# Patient Record
Sex: FEMALE | Race: WHITE
[De-identification: names, ages, dates, MRNs, and addresses within clinical notes are randomized per-mention and may not be internally consistent; named-entity substitution may affect disease eponyms.]

---

## 2017-06-09 ENCOUNTER — APPOINTMENT (OUTPATIENT)
Dept: DERMATOLOGY | Facility: CLINIC | Age: 57
End: 2017-06-09

## 2017-06-09 VITALS — WEIGHT: 160 LBS | HEIGHT: 63 IN | BODY MASS INDEX: 28.35 KG/M2

## 2017-06-09 DIAGNOSIS — Z86.79 PERSONAL HISTORY OF OTHER DISEASES OF THE CIRCULATORY SYSTEM: ICD-10-CM

## 2017-06-09 DIAGNOSIS — L21.9 SEBORRHEIC DERMATITIS, UNSPECIFIED: ICD-10-CM

## 2017-06-09 DIAGNOSIS — Z86.39 PERSONAL HISTORY OF OTHER ENDOCRINE, NUTRITIONAL AND METABOLIC DISEASE: ICD-10-CM

## 2017-06-09 RX ORDER — ESCITALOPRAM OXALATE 20 MG/1
20 TABLET, FILM COATED ORAL
Refills: 0 | Status: ACTIVE | COMMUNITY

## 2019-03-15 ENCOUNTER — MEDICATION RENEWAL (OUTPATIENT)
Age: 59
End: 2019-03-15

## 2019-05-21 ENCOUNTER — APPOINTMENT (OUTPATIENT)
Dept: DERMATOLOGY | Facility: CLINIC | Age: 59
End: 2019-05-21

## 2019-07-19 ENCOUNTER — EMERGENCY (EMERGENCY)
Facility: HOSPITAL | Age: 59
LOS: 0 days | Discharge: HOME | End: 2019-07-19
Attending: EMERGENCY MEDICINE | Admitting: EMERGENCY MEDICINE
Payer: OTHER MISCELLANEOUS

## 2019-07-19 VITALS
SYSTOLIC BLOOD PRESSURE: 146 MMHG | TEMPERATURE: 98 F | OXYGEN SATURATION: 98 % | DIASTOLIC BLOOD PRESSURE: 75 MMHG | HEART RATE: 76 BPM | RESPIRATION RATE: 18 BRPM

## 2019-07-19 VITALS — HEIGHT: 63 IN | WEIGHT: 179.9 LBS
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
## 2019-07-19 DIAGNOSIS — S60.111A CONTUSION OF RIGHT THUMB WITH DAMAGE TO NAIL, INITIAL ENCOUNTER: ICD-10-CM

## 2019-07-19 PROCEDURE — 99283 EMERGENCY DEPT VISIT LOW MDM: CPT

## 2019-07-19 PROCEDURE — 73140 X-RAY EXAM OF FINGER(S): CPT | Mod: 26,RT

## 2019-07-19 RX ORDER — ACETAMINOPHEN 500 MG
1000 TABLET ORAL ONCE
Refills: 0 | Status: COMPLETED | OUTPATIENT
Start: 2019-07-19 | End: 2019-07-19

## 2019-07-19 RX ADMIN — Medication 1000 MILLIGRAM(S): at 15:46

## 2019-07-19 NOTE — ED STATDOCS - PROGRESS NOTE DETAILS
Patient seen and evaluated.  No fracture.  Nail slightly lifting s/p previous injury 7 weeks ago, new nail growing underneath.  Offered to remove nail for patient, also offered hand consult.  She declines both -Fannie Mohan PA-C

## 2019-07-19 NOTE — ED ADULT TRIAGE NOTE - CHIEF COMPLAINT QUOTE
Patient presents with right thumb pain, patient reports she slammed it in a safe while at work. Patient reports right hand dominance.

## 2019-07-19 NOTE — ED STATDOCS - OBJECTIVE STATEMENT
59 y/o F with no relevant PMHx presenting to the ED s/p R thumb injury sustained today. Pt states that she was at work when she slammed her R finger in a safe. Pt notes that she is R hand dominant. Denies HA.

## 2019-07-19 NOTE — ED STATDOCS - CLINICAL SUMMARY MEDICAL DECISION MAKING FREE TEXT BOX
Patient presents with repeat injury to her thumb. No fracture on xray.  Reviewed management of fingernail and pain at home as she declines nail removal and hand consult.

## 2019-07-19 NOTE — ED ADULT NURSE NOTE - OBJECTIVE STATEMENT
pt presents to ED with right thumb pain/swelling s/p injury. pt states about 7 weeks ago pt slammed finger in safe at work .pt states she slammed her finger in the safe again today. [+ swelling noted. + mild redness noted. + ecchymosis noted to right thumb. a&ox3. breathing is even and unlabored. will continue to monitor and reassess.

## 2020-10-22 PROBLEM — Z78.9 OTHER SPECIFIED HEALTH STATUS: Chronic | Status: ACTIVE | Noted: 2019-07-19

## 2020-10-26 ENCOUNTER — APPOINTMENT (OUTPATIENT)
Dept: DERMATOLOGY | Facility: CLINIC | Age: 60
End: 2020-10-26
Payer: COMMERCIAL

## 2020-10-26 VITALS — BODY MASS INDEX: 28.35 KG/M2 | WEIGHT: 160 LBS | HEIGHT: 63 IN

## 2020-10-26 PROCEDURE — 99072 ADDL SUPL MATRL&STAF TM PHE: CPT

## 2020-10-26 PROCEDURE — 99203 OFFICE O/P NEW LOW 30 MIN: CPT

## 2020-10-26 RX ORDER — SULFACETAMIDE SODIUM, SULFUR 100; 50 MG/G; MG/G
10-5 EMULSION TOPICAL TWICE DAILY
Qty: 1 | Refills: 5 | Status: ACTIVE | COMMUNITY
Start: 2017-06-09 | End: 1900-01-01

## 2020-10-26 RX ORDER — ALPRAZOLAM 1 MG/1
1 TABLET ORAL
Qty: 60 | Refills: 0 | Status: ACTIVE | COMMUNITY
Start: 2020-10-20

## 2020-10-26 RX ORDER — LEVOTHYROXINE SODIUM 0.11 MG/1
112 TABLET ORAL
Qty: 30 | Refills: 0 | Status: ACTIVE | COMMUNITY
Start: 2020-06-02

## 2020-10-26 RX ORDER — METRONIDAZOLE 7.5 MG/G
0.75 CREAM TOPICAL
Qty: 45 | Refills: 3 | Status: ACTIVE | COMMUNITY
Start: 2020-10-26 | End: 1900-01-01

## 2020-10-26 RX ORDER — MINOCYCLINE HYDROCHLORIDE 100 MG/1
100 CAPSULE ORAL
Qty: 60 | Refills: 0 | Status: ACTIVE | COMMUNITY
Start: 2020-08-31

## 2020-10-26 RX ORDER — MINOCYCLINE HYDROCHLORIDE 50 MG/1
50 CAPSULE ORAL TWICE DAILY
Qty: 60 | Refills: 2 | Status: DISCONTINUED | COMMUNITY
Start: 2017-06-09 | End: 2020-10-26

## 2020-10-26 NOTE — HISTORY OF PRESENT ILLNESS
[de-identified] : The patient has been fit in for urgent appointment. Pt. c/o flaring of rosacea of face;  used multiple txs in past;\par on  BID, started approx 2 wks ago; \par

## 2020-10-26 NOTE — ASSESSMENT
[FreeTextEntry1] : Rosacea;  flaring, exacerbated by facemasks; \par on  BID, continue, will improve over next 2-3 wks; \par cont sulfa wash; also metrocream BID (has noritate at home)\par f/u 4-6 wks;  will taper if improved;  may need oracea as long as wearing masks

## 2020-10-26 NOTE — PHYSICAL EXAM
[FreeTextEntry3] : Skin examination performed of the face, neck, chest, hands, lower legs;\par The patient is well, alert and oriented, pleasant and cooperative.\par Eyelids, conjunctivae, oral mucosa, digits and nails all normal.  \par No cervical adenopathy.\par \par \par Erythema of central face with papules, pustules and telangiectasiae. and few nodules; \par cheeks, chin;  with some scale;

## 2020-12-04 ENCOUNTER — APPOINTMENT (OUTPATIENT)
Dept: DERMATOLOGY | Facility: CLINIC | Age: 60
End: 2020-12-04
Payer: COMMERCIAL

## 2020-12-04 PROCEDURE — 99072 ADDL SUPL MATRL&STAF TM PHE: CPT

## 2020-12-04 PROCEDURE — 99213 OFFICE O/P EST LOW 20 MIN: CPT

## 2020-12-04 NOTE — HISTORY OF PRESENT ILLNESS
[de-identified] : f/u for check of rosacea;  on  BID;  also sulfa wash, metrocream;  \par was doing well, however began to flare when going back to work after thanksgiving; \par

## 2020-12-04 NOTE — ASSESSMENT
[FreeTextEntry1] : rosacea: still active, but more flushing than inflammation; \par Therapeutic options and their risks and benefits; along with multiple diagnostic possibilities were discussed at length; risks and benefits of further study were discussed;\par \par exacerbated by masks, try to use fan to keep cool at work\par \par cont MCN, reduce to 100/d;  cont sulfa, try to substitute soolantra for metrocream;\par f/u 6-8 wks;

## 2020-12-09 ENCOUNTER — APPOINTMENT (OUTPATIENT)
Dept: DERMATOLOGY | Facility: CLINIC | Age: 60
End: 2020-12-09
Payer: COMMERCIAL

## 2020-12-09 PROCEDURE — 99072 ADDL SUPL MATRL&STAF TM PHE: CPT

## 2020-12-09 PROCEDURE — 99214 OFFICE O/P EST MOD 30 MIN: CPT

## 2020-12-09 RX ORDER — PREDNISONE 20 MG/1
20 TABLET ORAL
Qty: 21 | Refills: 0 | Status: ACTIVE | COMMUNITY
Start: 2020-12-09 | End: 1900-01-01

## 2020-12-09 NOTE — PHYSICAL EXAM
[FreeTextEntry3] : Erythema of central face with papules, pustules and telangiectasiae. \par also with eczematous patches on temples, cheeks, forehead;

## 2020-12-09 NOTE — ASSESSMENT
[FreeTextEntry1] : facial eruption; \par Therapeutic options and their risks and benefits; along with multiple diagnostic possibilities were discussed at length; risks and benefits of further study were discussed;\par \par clinically consistent with contact dermatitis;  ? from soolantra vs. other agent;  appears to have reacted to quickly to be from Rx; \par hold soolantra, cont MCN\par Prednisone taper 12 days;  when finished, try soolantra on one cheek, then gradually increase;  has f/u 1 month, sooner prn

## 2020-12-09 NOTE — HISTORY OF PRESENT ILLNESS
[de-identified] : The patient has been fit in for urgent appointment.\par Was seen 1 week ago;  just started Soolantra yesterday, now with significant itching, rashes over large part of face; \par still taking sulfa wash, MCN;

## 2021-01-22 ENCOUNTER — NON-APPOINTMENT (OUTPATIENT)
Age: 61
End: 2021-01-22

## 2021-01-22 ENCOUNTER — APPOINTMENT (OUTPATIENT)
Dept: DERMATOLOGY | Facility: CLINIC | Age: 61
End: 2021-01-22
Payer: COMMERCIAL

## 2021-01-22 DIAGNOSIS — L71.9 ROSACEA, UNSPECIFIED: ICD-10-CM

## 2021-01-22 DIAGNOSIS — L23.89 ALLERGIC CONTACT DERMATITIS DUE TO OTHER AGENTS: ICD-10-CM

## 2021-01-22 PROCEDURE — 99214 OFFICE O/P EST MOD 30 MIN: CPT

## 2021-01-22 PROCEDURE — 99072 ADDL SUPL MATRL&STAF TM PHE: CPT

## 2021-01-22 NOTE — ASSESSMENT
[FreeTextEntry1] : Facial itching, inflammation;  \par now more consistent with allergic contact, superimposed upon previously well controlled rosacea; \par continue MCN, soolantra for now\par set up patch testing- ? nickel allergy to mask\par \par Therapeutic options and their risks and benefits; along with multiple diagnostic possibilities were discussed at length; risks and benefits of further study were discussed;\par \par

## 2021-03-01 ENCOUNTER — APPOINTMENT (OUTPATIENT)
Dept: DERMATOLOGY | Facility: CLINIC | Age: 61
End: 2021-03-01

## 2021-03-03 ENCOUNTER — APPOINTMENT (OUTPATIENT)
Dept: DERMATOLOGY | Facility: CLINIC | Age: 61
End: 2021-03-03

## 2021-03-05 ENCOUNTER — APPOINTMENT (OUTPATIENT)
Dept: DERMATOLOGY | Facility: CLINIC | Age: 61
End: 2021-03-05

## 2021-03-10 ENCOUNTER — APPOINTMENT (OUTPATIENT)
Dept: ORTHOPEDIC SURGERY | Facility: CLINIC | Age: 61
End: 2021-03-10
Payer: COMMERCIAL

## 2021-03-10 DIAGNOSIS — M79.642 PAIN IN LEFT HAND: ICD-10-CM

## 2021-03-10 PROCEDURE — 99204 OFFICE O/P NEW MOD 45 MIN: CPT

## 2021-03-10 PROCEDURE — 99072 ADDL SUPL MATRL&STAF TM PHE: CPT

## 2021-03-10 PROCEDURE — 73130 X-RAY EXAM OF HAND: CPT | Mod: 26,LT

## 2021-03-10 NOTE — PHYSICAL EXAM
[de-identified] : Left hand Physical Examination:\par \par The patient is AAOx3.  \par Neurovascularly Intact: Yes\par Erythema, Warmth, Rubor: Negative\par Swelling: Negative\par \par *Wrist ROM normal in all planes. \par \par ROM Fingers:\par MCPJ: 0-90\par PIPJ: 0-120\par DIPJ: 0-60\par Thumb IPJ: 0-60\par *Patient can make a normal fist with all fingers pointing normally towards the scaphoid. \par \par Thumb ROM:\par MCP Joint: Extension 5 degrees/Flexion 60 degrees\par IP Joint: 10 degrees Hyperextension/Flexion 80 degrees\par *Normal thumb adduction into the palm of the hand. \par Grind Test (CMC Joint OA): Positive with severe pain.\par \par Osteoarthritis: \par Heberden's Node's (DIP): Negative\par Carolee's Node's (PIP): Negative\par Lefors Neck Deformity (RA): Negative\par Ulnar Deviation at MCPJ (RA): Negative\par \par Deformities/Abnormalities:\par Mallet Finger: Negative\par Dupuytren's Contractures: Negative\par Trigger Finger: Negative\par \par  [de-identified] : X-rays of the left hand reviewed, 3/10/2021: CMC arthritis.  No fractures present.

## 2021-03-10 NOTE — HISTORY OF PRESENT ILLNESS
[de-identified] : Mariana is a 60-year-old female who presents with left hand/thumb pain.  She relates the pain to work.  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  The patient started to experience base of thumb pain left hand.  Her pain scale at worst is an 8 out of 10.  She has no numbness or tingling in the hand or wrist.  She has no other complaints.  The patient cannot take NSAIDs.

## 2021-03-10 NOTE — DISCUSSION/SUMMARY
[de-identified] : 1.  Fluoroscopic injection left CMC ordered, cortisone.  Patient was given a prescription and she can follow-up with interventional radiology.\par 2.  Tylenol arthritis prescribed.  Use as directed with food.\par 3.  CMC cool splint given.  Wear as directed.\par 4.  Follow-up in 6 to 8 weeks if pain continues with hand surgeon.\par 5.  All of her questions were answered.  She understood the treatment course at this time.

## 2021-03-22 ENCOUNTER — APPOINTMENT (OUTPATIENT)
Dept: DERMATOLOGY | Facility: CLINIC | Age: 61
End: 2021-03-22

## 2021-03-24 ENCOUNTER — APPOINTMENT (OUTPATIENT)
Dept: DERMATOLOGY | Facility: CLINIC | Age: 61
End: 2021-03-24

## 2021-03-26 ENCOUNTER — APPOINTMENT (OUTPATIENT)
Dept: DERMATOLOGY | Facility: CLINIC | Age: 61
End: 2021-03-26

## 2021-04-23 ENCOUNTER — APPOINTMENT (OUTPATIENT)
Dept: ORTHOPEDIC SURGERY | Facility: CLINIC | Age: 61
End: 2021-04-23
Payer: COMMERCIAL

## 2021-04-23 PROCEDURE — 99213 OFFICE O/P EST LOW 20 MIN: CPT

## 2021-04-23 PROCEDURE — 99072 ADDL SUPL MATRL&STAF TM PHE: CPT

## 2021-04-23 NOTE — PHYSICAL EXAM
[Normal Touch] : sensation intact for  touch [Normal] : no peripheral adenopathy appreciated [de-identified] : Left hand: There is tenderness over the left basal joint with a positive grind test and shoulder deformity. There is a positive crank test. There is swelling appreciated over the affected CMC joint which is not present on the opposite side. There is no evidence of infection or lymphadenopathy bilaterally to the level of the elbows There is no evidence of MCP hyperextension bilaterally. There is a negative Finkelstein's test There is no tenderness over the A-1 pulleys bilaterally. 5/5 strength bilaterally. \par \par The wrists have a symmetric and full range of motion bilaterally with no pain upon forced flexion, extension, pronation and supination. There is no tenderness over the scaphoid scapholunate region ligaments and no tenderness of the TFCC bilaterally. There is no tenderness of the pisotriquetral hamate hook. The second through fifth CMC his is stable and nontender bilaterally.\par There is a negative carpal tunnel compression test or Tinel's bilaterally.   [de-identified] : bernadetter [de-identified] : 3 views of the left hand reviewed taken previously reveal moderate to severe basal joint arthritis with joint space narrowing and osteophyte formation.  There is no fracture or acute bony abnormality.

## 2021-04-23 NOTE — ASSESSMENT
[FreeTextEntry1] : 60-year-old female presents today for reevaluation following conservative management of left basal joint arthritis.\par She reports an 85% improvement in her symptoms with use of a Comfort Cool thumb brace and status post steroid injection on April 5.  We discussed the nature and history of the condition at length.  The spectrum of treatment including nonoperative modalities to surgical intervention was elucidated.  At this time she has been recommended for continued conservative management.  She will continue use of the Comfort Cool thumb brace for activities and for comfort.  She will take over-the-counter Tylenol arthritis for pain.  She has been recommended for as needed follow-up for repeat steroid injections, and has been told that she should wait 3 months in between injections.  She is understanding of the above and is in agreement with the recommended plan.  All questions have been answered.

## 2021-11-29 ENCOUNTER — APPOINTMENT (OUTPATIENT)
Dept: ORTHOPEDIC SURGERY | Facility: CLINIC | Age: 61
End: 2021-11-29
Payer: COMMERCIAL

## 2021-11-29 DIAGNOSIS — S89.91XA UNSPECIFIED INJURY OF RIGHT LOWER LEG, INITIAL ENCOUNTER: ICD-10-CM

## 2021-11-29 PROCEDURE — 99214 OFFICE O/P EST MOD 30 MIN: CPT

## 2021-11-29 PROCEDURE — 73562 X-RAY EXAM OF KNEE 3: CPT | Mod: RT

## 2021-11-29 NOTE — DISCUSSION/SUMMARY
[de-identified] : Today I had a lengthy discussion with the patient regarding their right knee pain. I have addressed all the patient's concerns surrounding the pathology of their condition. XR imaging was completed in office today and results were reviewed with the patient. I recommend the patient undergo a course of physical therapy for the right knee  2-3 times a week for a total of 6-8 weeks. A prescription was given for the physical therapy today. I recommend that the patient utilize Voltaren gel topically. If the Voltaren gel could not be obtained, Icy Hot, Biofreeze, or Bengay can be utilized instead. I recommend that the patient utilize ice, Tylenol, and heat PRN. They can also elevate their right knee above the level of the heart. She may utilize an OTC knee sleeve as tolerated. \par \par The patient understood and verbally agreed to the treatment plan. All of their questions were answered and they were satisfied with the visit. The patient should call the office if they have any questions or experience worsening symptoms. I would like to see the patient back in the office in 6-8 weeks if there is no improvement to reassess their condition. 				\par \par \par Possible MRI if there is no improvement or if there is worsening in symptoms.

## 2021-11-29 NOTE — PHYSICAL EXAM
[de-identified] : General: Alert and oriented x3. In no acute distress. Pleasant in nature with a normal affect. No apparent respiratory distress.\par \par R Knee Exam\par Skin: Clean, dry, intact\par Inspection: No obvious malalignment, no masses, no swelling, no effusion\par Pulses: 2+ DP/PT pulses\par ROM: R Knee 0-130 degrees of flexion. No pain with deep knee flexion.\par Tenderness: + Medial femoral condyle pain. No MJLT. No LJLT. No pain over the patella facets. No pain to the quadriceps mechanism.\par Stability: Stable to varus, valgus, lachman testing. Negative anterior/posterior drawer.\par Strength: 5/5 Q/H/TA/GS/EHL, no atrophy\par Neuro: In tact to light touch throughout, DTR's normal\par Additional tests: Negative McMurrays test, Negative patellar grind test.		\par  [de-identified] : 3V of the right knee were ordered, obtained, and reviewed by me today, 11/29/2021, revealed: No acute fractures\par

## 2021-11-29 NOTE — HISTORY OF PRESENT ILLNESS
[de-identified] : 60 yo female presents to the office for right knee pain. Her pain began approximately 3 weeks ago when she was running to get to a pot of boiling water in her kitchen and slipped and sustained a direct impact to her right knee.  She notes that after the injury she was having difficulty walking as she had significant pain and stiffness of her knee.  She had tried ice and rest noting some improvements over the past 2 weeks.  Patient notes that she does not take NSAIDs but has taken some Tylenol for her pain.  She continues to have a lot of pain getting up from a seated position and as a result has avoided stairs at work and is only taking the elevator.  Today she rates her pain a 6 out of 10 and says it is a constant dull aching along the medial side of her right knee that worsens with walking and becomes a sharp pain.  She has no other complaints at this time.

## 2021-11-29 NOTE — ADDENDUM
[FreeTextEntry1] : I, Alda Mendieta, acted solely as a scribe for Dr. Yobani Renae on this date 11/29/2021.\par \par All medical record entries made by the Scribe were at my, Dr. Yobani Renae, direction and personally dictated by me on 11/29/2021 . I have reviewed the chart and agree that the record accurately reflects my personal performance of the history, physical exam, assessment and plan. I have also personally directed, reviewed, and agreed with the chart.	\par

## 2021-12-06 ENCOUNTER — APPOINTMENT (OUTPATIENT)
Dept: ORTHOPEDIC SURGERY | Facility: CLINIC | Age: 61
End: 2021-12-06
Payer: COMMERCIAL

## 2021-12-06 DIAGNOSIS — M19.049 PRIMARY OSTEOARTHRITIS, UNSPECIFIED HAND: ICD-10-CM

## 2021-12-06 PROCEDURE — 20600 DRAIN/INJ JOINT/BURSA W/O US: CPT | Mod: FA

## 2021-12-06 PROCEDURE — 99213 OFFICE O/P EST LOW 20 MIN: CPT | Mod: 25

## 2021-12-06 NOTE — ASSESSMENT
[FreeTextEntry1] : 60-year-old female presents today for reevaluation of her left basal joint arthritis. She received an injection today as dictated above, tolerating the procedure well. She has been recommended to continue with use of bracing as needed for activity, as well as use of topical NSAID/tylenol for pain. She will follow up on an as needed basis.

## 2021-12-06 NOTE — PROCEDURE
[FreeTextEntry1] : Injection: Thumb Left\par \par There was a discussion with the patient regarding this procedure and all questions/concerns were answered.  All of the risks, benefits and alternatives were discussed at length.  These include but are not limited to bleeding, infection, and allergic reaction.  Alcohol was used to clean, sterilize and prep the skin at the injection site on the radial aspect of the left thumb base.  0.5cc of 1% lidocaine was injected using a 27G needle to the injection site.  A 25G needle was used to inject 1cc of 4mg/mL dexamethasone into the left thumb base under fluoroscopic guidance.  A sterile bandage was applied to the site of the injection.  The patient tolerated the procedure well and there were no complications.

## 2021-12-06 NOTE — PHYSICAL EXAM
[Normal Touch] : sensation intact for  touch [Normal] : no peripheral adenopathy appreciated [de-identified] : Left hand: There is tenderness over the left basal joint with a positive grind test and shoulder deformity. There is a positive crank test. There is swelling appreciated over the affected CMC joint which is not present on the opposite side. There is no evidence of infection or lymphadenopathy bilaterally to the level of the elbows There is no evidence of MCP hyperextension bilaterally. There is a negative Finkelstein's test There is no tenderness over the A-1 pulleys bilaterally. 5/5 strength bilaterally. \par \par The wrists have a symmetric and full range of motion bilaterally with no pain upon forced flexion, extension, pronation and supination. There is no tenderness over the scaphoid scapholunate region ligaments and no tenderness of the TFCC bilaterally. There is no tenderness of the pisotriquetral hamate hook. The second through fifth CMC his is stable and nontender bilaterally.\par There is a negative carpal tunnel compression test or Tinel's bilaterally.   [de-identified] : bernadetter [de-identified] : 3 views of the left hand reviewed taken previously reveal moderate to severe basal joint arthritis with joint space narrowing and osteophyte formation.  There is no fracture or acute bony abnormality.

## 2021-12-06 NOTE — HISTORY OF PRESENT ILLNESS
[FreeTextEntry1] : 60-year-old female presents today for reevaluation of her left thumb pain.  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  She has been treated conservatively today with use of a Comfort Cool thumb brace and activity modification.  She was recommended for an ultrasound-guided steroid injection to the left basal joint, and notes that she had it completed on April 5.  She reports significant benefit from the injection and notes she is about 85% improved.  She has been taking Tylenol as needed, notes she cannot take NSAIDs due to prior gastric surgery.  The patient presents today for evaluation and further treatment.\par \par 12/06/2021: Patient presents for reevaluation of her left thumb pain, 2/2 basal joint arthritis. She has been wearing a comfort cool brace with some relief, but overall has noted an increase in pain over the last couple of months. She reports pain with activity such as twisting, lifting, turning, pushing and gripping activity. She reports benefit from the initial steroid injection she received in April, and would like to consider a repeat steroid injection.

## 2021-12-22 ENCOUNTER — OUTPATIENT (OUTPATIENT)
Dept: OUTPATIENT SERVICES | Facility: HOSPITAL | Age: 61
LOS: 1 days | End: 2021-12-22
Payer: COMMERCIAL

## 2021-12-22 DIAGNOSIS — Z20.828 CONTACT WITH AND (SUSPECTED) EXPOSURE TO OTHER VIRAL COMMUNICABLE DISEASES: ICD-10-CM

## 2021-12-22 LAB — SARS-COV-2 RNA SPEC QL NAA+PROBE: SIGNIFICANT CHANGE UP

## 2021-12-22 PROCEDURE — U0005: CPT

## 2021-12-22 PROCEDURE — C9803: CPT

## 2021-12-22 PROCEDURE — U0003: CPT

## 2021-12-23 DIAGNOSIS — Z20.828 CONTACT WITH AND (SUSPECTED) EXPOSURE TO OTHER VIRAL COMMUNICABLE DISEASES: ICD-10-CM

## 2021-12-30 ENCOUNTER — OUTPATIENT (OUTPATIENT)
Dept: OUTPATIENT SERVICES | Facility: HOSPITAL | Age: 61
LOS: 1 days | End: 2021-12-30
Payer: COMMERCIAL

## 2021-12-30 DIAGNOSIS — Z20.828 CONTACT WITH AND (SUSPECTED) EXPOSURE TO OTHER VIRAL COMMUNICABLE DISEASES: ICD-10-CM

## 2021-12-30 PROCEDURE — U0003: CPT

## 2021-12-30 PROCEDURE — U0005: CPT

## 2021-12-31 DIAGNOSIS — Z20.828 CONTACT WITH AND (SUSPECTED) EXPOSURE TO OTHER VIRAL COMMUNICABLE DISEASES: ICD-10-CM

## 2022-03-02 ENCOUNTER — RX RENEWAL (OUTPATIENT)
Age: 62
End: 2022-03-02

## 2022-03-28 RX ORDER — IVERMECTIN 10 MG/G
1 CREAM TOPICAL
Qty: 45 | Refills: 3 | Status: ACTIVE | COMMUNITY
Start: 2020-12-04 | End: 1900-01-01

## 2023-06-22 ENCOUNTER — OUTPATIENT (OUTPATIENT)
Dept: OUTPATIENT SERVICES | Facility: HOSPITAL | Age: 63
LOS: 1 days | End: 2023-06-22
Payer: COMMERCIAL

## 2023-06-22 DIAGNOSIS — R73.01 IMPAIRED FASTING GLUCOSE: ICD-10-CM

## 2023-06-22 DIAGNOSIS — E78.5 HYPERLIPIDEMIA, UNSPECIFIED: ICD-10-CM

## 2023-06-22 PROCEDURE — 80061 LIPID PANEL: CPT

## 2023-06-22 PROCEDURE — 36415 COLL VENOUS BLD VENIPUNCTURE: CPT

## 2023-06-22 PROCEDURE — 84443 ASSAY THYROID STIM HORMONE: CPT

## 2023-06-22 PROCEDURE — 83036 HEMOGLOBIN GLYCOSYLATED A1C: CPT

## 2023-06-22 PROCEDURE — 80053 COMPREHEN METABOLIC PANEL: CPT

## 2023-06-22 PROCEDURE — 84439 ASSAY OF FREE THYROXINE: CPT

## 2023-06-23 ENCOUNTER — TRANSCRIPTION ENCOUNTER (OUTPATIENT)
Age: 63
End: 2023-06-23

## 2023-06-23 DIAGNOSIS — R73.01 IMPAIRED FASTING GLUCOSE: ICD-10-CM

## 2023-06-23 DIAGNOSIS — E78.5 HYPERLIPIDEMIA, UNSPECIFIED: ICD-10-CM

## 2024-02-05 ENCOUNTER — OFFICE (OUTPATIENT)
Dept: URBAN - METROPOLITAN AREA CLINIC 102 | Facility: CLINIC | Age: 64
Setting detail: OPHTHALMOLOGY
End: 2024-02-05
Payer: MEDICARE

## 2024-02-05 DIAGNOSIS — H25.13: ICD-10-CM

## 2024-02-05 DIAGNOSIS — H35.363: ICD-10-CM

## 2024-02-05 DIAGNOSIS — H35.3132: ICD-10-CM

## 2024-02-05 DIAGNOSIS — H52.4: ICD-10-CM

## 2024-02-05 PROBLEM — H52.7 REFRACTIVE ERROR: Status: ACTIVE | Noted: 2024-02-05

## 2024-02-05 PROCEDURE — 92015 DETERMINE REFRACTIVE STATE: CPT | Performed by: OPHTHALMOLOGY

## 2024-02-05 PROCEDURE — 92250 FUNDUS PHOTOGRAPHY W/I&R: CPT | Performed by: OPHTHALMOLOGY

## 2024-02-05 PROCEDURE — 92020 GONIOSCOPY: CPT | Performed by: OPHTHALMOLOGY

## 2024-02-05 PROCEDURE — 99204 OFFICE O/P NEW MOD 45 MIN: CPT | Performed by: OPHTHALMOLOGY

## 2024-02-05 ASSESSMENT — REFRACTION_CURRENTRX
OS_AXIS: 174
OD_VPRISM_DIRECTION: SV
OD_SPHERE: +5.00
OS_VPRISM_DIRECTION: SV
OS_AXIS: 159
OD_OVR_VA: 20/
OS_SPHERE: +4.75
OD_VPRISM_DIRECTION: SV
OD_CYLINDER: -0.75
OD_OVR_VA: 20/
OS_OVR_VA: 20/
OD_SPHERE: +2.00
OS_SPHERE: +2.00
OD_AXIS: 109
OD_AXIS: 019
OS_CYLINDER: -0.50
OD_CYLINDER: -0.50
OS_OVR_VA: 20/
OS_CYLINDER: -0.50
OS_VPRISM_DIRECTION: SV

## 2024-02-05 ASSESSMENT — REFRACTION_MANIFEST
OD_SPHERE: +1.75
OS_SPHERE: +1.75
OD_VA1: 20/40
OD_ADD: +2.50
OS_CYLINDER: SPH
OD_CYLINDER: -0.25
OS_ADD: +2.50
OD_AXIS: 070
OS_VA1: 20/40+

## 2024-02-05 ASSESSMENT — SPHEQUIV_DERIVED
OS_SPHEQUIV: 1.5
OD_SPHEQUIV: 1.625
OD_SPHEQUIV: 1.875

## 2024-02-05 ASSESSMENT — REFRACTION_AUTOREFRACTION
OS_SPHERE: +1.75
OD_SPHERE: +2.00
OD_CYLINDER: -0.25
OS_AXIS: 135
OD_AXIS: 072
OS_CYLINDER: -0.50

## 2024-02-05 ASSESSMENT — CONFRONTATIONAL VISUAL FIELD TEST (CVF)
OS_FINDINGS: FULL
OD_FINDINGS: FULL

## 2024-05-31 ENCOUNTER — EMERGENCY (EMERGENCY)
Facility: HOSPITAL | Age: 64
LOS: 0 days | Discharge: HOME | End: 2024-05-31
Attending: EMERGENCY MEDICINE
Payer: MEDICARE

## 2024-05-31 VITALS
DIASTOLIC BLOOD PRESSURE: 77 MMHG | OXYGEN SATURATION: 96 % | WEIGHT: 239.86 LBS | SYSTOLIC BLOOD PRESSURE: 131 MMHG | TEMPERATURE: 98 F | HEART RATE: 100 BPM | RESPIRATION RATE: 19 BRPM

## 2024-05-31 VITALS
RESPIRATION RATE: 20 BRPM | OXYGEN SATURATION: 99 % | HEART RATE: 100 BPM | DIASTOLIC BLOOD PRESSURE: 86 MMHG | SYSTOLIC BLOOD PRESSURE: 144 MMHG | TEMPERATURE: 99 F

## 2024-05-31 DIAGNOSIS — Z91.018 ALLERGY TO OTHER FOODS: ICD-10-CM

## 2024-05-31 DIAGNOSIS — F10.129 ALCOHOL ABUSE WITH INTOXICATION, UNSPECIFIED: ICD-10-CM

## 2024-05-31 DIAGNOSIS — R55 SYNCOPE AND COLLAPSE: ICD-10-CM

## 2024-05-31 LAB
ALBUMIN SERPL ELPH-MCNC: 3.4 G/DL — SIGNIFICANT CHANGE UP (ref 3.3–5)
ALP SERPL-CCNC: 92 U/L — SIGNIFICANT CHANGE UP (ref 40–120)
ALT FLD-CCNC: 91 U/L — HIGH (ref 12–78)
ANION GAP SERPL CALC-SCNC: 5 MMOL/L — SIGNIFICANT CHANGE UP (ref 5–17)
AST SERPL-CCNC: 125 U/L — HIGH (ref 15–37)
BASOPHILS # BLD AUTO: 0.08 K/UL — SIGNIFICANT CHANGE UP (ref 0–0.2)
BASOPHILS NFR BLD AUTO: 1 % — SIGNIFICANT CHANGE UP (ref 0–2)
BILIRUB SERPL-MCNC: 0.3 MG/DL — SIGNIFICANT CHANGE UP (ref 0.2–1.2)
BUN SERPL-MCNC: 17 MG/DL — SIGNIFICANT CHANGE UP (ref 7–23)
CALCIUM SERPL-MCNC: 8.4 MG/DL — LOW (ref 8.5–10.1)
CHLORIDE SERPL-SCNC: 107 MMOL/L — SIGNIFICANT CHANGE UP (ref 96–108)
CO2 SERPL-SCNC: 27 MMOL/L — SIGNIFICANT CHANGE UP (ref 22–31)
CREAT SERPL-MCNC: 0.82 MG/DL — SIGNIFICANT CHANGE UP (ref 0.5–1.3)
EGFR: 79 ML/MIN/1.73M2 — SIGNIFICANT CHANGE UP
EOSINOPHIL # BLD AUTO: 0.07 K/UL — SIGNIFICANT CHANGE UP (ref 0–0.5)
EOSINOPHIL NFR BLD AUTO: 0.9 % — SIGNIFICANT CHANGE UP (ref 0–6)
ETHANOL SERPL-MCNC: 299 MG/DL — HIGH (ref 0–10)
GLUCOSE SERPL-MCNC: 120 MG/DL — HIGH (ref 70–99)
HCT VFR BLD CALC: 44.2 % — SIGNIFICANT CHANGE UP (ref 34.5–45)
HGB BLD-MCNC: 14.6 G/DL — SIGNIFICANT CHANGE UP (ref 11.5–15.5)
IMM GRANULOCYTES NFR BLD AUTO: 0.3 % — SIGNIFICANT CHANGE UP (ref 0–0.9)
LYMPHOCYTES # BLD AUTO: 2.2 K/UL — SIGNIFICANT CHANGE UP (ref 1–3.3)
LYMPHOCYTES # BLD AUTO: 27.5 % — SIGNIFICANT CHANGE UP (ref 13–44)
MCHC RBC-ENTMCNC: 30.9 PG — SIGNIFICANT CHANGE UP (ref 27–34)
MCHC RBC-ENTMCNC: 33 GM/DL — SIGNIFICANT CHANGE UP (ref 32–36)
MCV RBC AUTO: 93.6 FL — SIGNIFICANT CHANGE UP (ref 80–100)
MONOCYTES # BLD AUTO: 0.62 K/UL — SIGNIFICANT CHANGE UP (ref 0–0.9)
MONOCYTES NFR BLD AUTO: 7.8 % — SIGNIFICANT CHANGE UP (ref 2–14)
NEUTROPHILS # BLD AUTO: 5.01 K/UL — SIGNIFICANT CHANGE UP (ref 1.8–7.4)
NEUTROPHILS NFR BLD AUTO: 62.5 % — SIGNIFICANT CHANGE UP (ref 43–77)
PLATELET # BLD AUTO: 236 K/UL — SIGNIFICANT CHANGE UP (ref 150–400)
POTASSIUM SERPL-MCNC: 4.1 MMOL/L — SIGNIFICANT CHANGE UP (ref 3.5–5.3)
POTASSIUM SERPL-SCNC: 4.1 MMOL/L — SIGNIFICANT CHANGE UP (ref 3.5–5.3)
PROT SERPL-MCNC: 8.1 GM/DL — SIGNIFICANT CHANGE UP (ref 6–8.3)
RBC # BLD: 4.72 M/UL — SIGNIFICANT CHANGE UP (ref 3.8–5.2)
RBC # FLD: 12.9 % — SIGNIFICANT CHANGE UP (ref 10.3–14.5)
SODIUM SERPL-SCNC: 139 MMOL/L — SIGNIFICANT CHANGE UP (ref 135–145)
WBC # BLD: 8 K/UL — SIGNIFICANT CHANGE UP (ref 3.8–10.5)
WBC # FLD AUTO: 8 K/UL — SIGNIFICANT CHANGE UP (ref 3.8–10.5)

## 2024-05-31 PROCEDURE — 80307 DRUG TEST PRSMV CHEM ANLYZR: CPT

## 2024-05-31 PROCEDURE — 85025 COMPLETE CBC W/AUTO DIFF WBC: CPT

## 2024-05-31 PROCEDURE — 99284 EMERGENCY DEPT VISIT MOD MDM: CPT

## 2024-05-31 PROCEDURE — 36415 COLL VENOUS BLD VENIPUNCTURE: CPT

## 2024-05-31 PROCEDURE — 99285 EMERGENCY DEPT VISIT HI MDM: CPT

## 2024-05-31 PROCEDURE — 80053 COMPREHEN METABOLIC PANEL: CPT

## 2024-05-31 NOTE — ED ADULT TRIAGE NOTE - CHIEF COMPLAINT QUOTE
pt bib wheelchair to triage from bathroom in hospital lobby. pt had an unwitnessed syncope while having bowel movement. unknown head strike. unknown down time. no obvious signs of trauma at this time. pmh Hashimoto's, HTN, HLD. -pain -blood thinners. +aob

## 2024-05-31 NOTE — ED ADULT NURSE NOTE - OBJECTIVE STATEMENT
Pt is 63y F, A&Ox3 who presents to ED with c/o fall. As per pt, "I was in the bathroom when I fell." -LOC, -HS, -use of blood thinners. Pt admits to drinking 1 bottle of wine PTA. Pt ambulatory. NAD noted. No obvious trauma noted. Denies CP/SOB, dizziness, n/v, headache, palpations.

## 2024-05-31 NOTE — ED PROVIDER NOTE - PATIENT PORTAL LINK FT
You can access the FollowMyHealth Patient Portal offered by [facility] by registering at the following website: [email or web address]. By joining [facility]’s FollowMyHealth portal, you will also be able to view your health information using other applications (apps) compatible with our system.

## 2024-05-31 NOTE — ED ADULT NURSE REASSESSMENT NOTE - NS ED NURSE REASSESS COMMENT FT1
Pt has no complaints at this time. NAD noted. Pt ambulated. MD Cabrera aware. Safety and comfort maintained.

## 2024-05-31 NOTE — ED ADULT NURSE NOTE - NSFALLUNIVINTERV_ED_ALL_ED
Bed/Stretcher in lowest position, wheels locked, appropriate side rails in place/Call bell, personal items and telephone in reach/Instruct patient to call for assistance before getting out of bed/chair/stretcher/Non-slip footwear applied when patient is off stretcher/Hunker to call system/Physically safe environment - no spills, clutter or unnecessary equipment/Purposeful proactive rounding/Room/bathroom lighting operational, light cord in reach

## 2024-05-31 NOTE — ED PROVIDER NOTE - OBJECTIVE STATEMENT
63-year-old female presents after being found down in the bathroom of the hospital while visiting patient.Per hospital staff patient was sitting in her own stool and urine, alcohol on breath.  Patient has no acute complaints at this time.Patient endorses drinking 1 bottle of wine prior to arrival.

## 2024-05-31 NOTE — ED PROVIDER NOTE - PHYSICAL EXAMINATION
Gen: Well appearing in NAD  Head: NC/AT  Neck: Trachea midline  Resp: No distress  Ext: No deformities  Neuro: Mildly slurred  Skin: Warm and dry as visualized  Psych: Normal affect and mood

## 2024-09-25 ENCOUNTER — APPOINTMENT (OUTPATIENT)
Dept: ORTHOPEDIC SURGERY | Facility: CLINIC | Age: 64
End: 2024-09-25

## 2024-09-25 VITALS — SYSTOLIC BLOOD PRESSURE: 129 MMHG | HEART RATE: 101 BPM | HEIGHT: 63 IN | DIASTOLIC BLOOD PRESSURE: 72 MMHG

## 2024-09-25 DIAGNOSIS — M75.41 IMPINGEMENT SYNDROME OF RIGHT SHOULDER: ICD-10-CM

## 2024-09-25 PROCEDURE — 73030 X-RAY EXAM OF SHOULDER: CPT | Mod: RT

## 2024-09-25 PROCEDURE — 99203 OFFICE O/P NEW LOW 30 MIN: CPT

## 2024-09-26 DIAGNOSIS — Z86.39 PERSONAL HISTORY OF OTHER ENDOCRINE, NUTRITIONAL AND METABOLIC DISEASE: ICD-10-CM

## 2024-09-26 DIAGNOSIS — Z87.39 PERSONAL HISTORY OF OTHER DISEASES OF THE MUSCULOSKELETAL SYSTEM AND CONNECTIVE TISSUE: ICD-10-CM

## 2024-09-30 PROBLEM — M75.41 IMPINGEMENT SYNDROME OF RIGHT SHOULDER: Status: ACTIVE | Noted: 2024-09-25

## 2024-09-30 NOTE — REVIEW OF SYSTEMS
[Joint Pain] : joint pain [Heartburn] : heartburn [Anxiety] : anxiety [Depression] : depression [Negative] : Heme/Lymph

## 2024-09-30 NOTE — DISCUSSION/SUMMARY
[de-identified] : At this time, due to right shoulder impingement syndrome, I recommended to start on a course of physical therapy, topical anti-inflammatory, and reassessment in 4-6 weeks.

## 2024-09-30 NOTE — ADDENDUM
[FreeTextEntry1] : This note was written by Timothy Markham on 09/30/2024, acting as a scribe for Rashad Hairston III, MD

## 2024-09-30 NOTE — HISTORY OF PRESENT ILLNESS
[de-identified] : The patient comes in today with complaints of pain to her right shoulder.  She states it has been going on for several months and has been getting a little worse recently.  The patient states the onset/injury occurred in 06/2024.  This injury is not work related or due to an automobile accident.  The patient states the pain is constant when using the arm.    [5] : a current pain level of 5/10 [de-identified] : Lifting anything and moving in certain position [de-identified] : No movement

## 2024-09-30 NOTE — PHYSICAL EXAM
[Normal] : Gait: normal [de-identified] : Left Shoulder: Shoulder: Range of Motion in Degrees:                                 Claimant:   Normal:    Abduction (Active)   180   180 degrees    Abduction (Passive)   180   180 degrees    Forward elevation (Active):   180   180 degrees    Forward elevation (Passive):  180   180 degrees    External rotation (Active):   45   45 degrees    External rotation (Passive):   45   45 degrees    Internal rotation (Active):   L-1   L-1    Internal rotation (Passive):   L-1   L-1      No motor weakness to internal rotation, external rotation or abduction in the scapular plane.  Negative crank test.  Negative Nash's test.  Negative Speeds test. Negative Yergason's test.  Negative cross arm test.  No tenderness to palpation at the AC joint. Negative Dewitt sign.  Negative Neers sign.  Negative apprehension. Negative sulcus sign.  No gross neurological or vascular deficits distally.  Skin is intact.  No rashes, scars or lesions.  2+ radial and ulnar pulses. No extra-articular swelling or tenderness.  Right Shoulder:  Shoulder: Range of Motion in Degrees:                                     Claimant: Normal:  Abduction (Active)                   180                180 degrees  Abduction (Passive)   180                180 degrees  Forward elevation (Active):   180                180 degrees  Forward elevation (Passive):   180                180 degrees  External rotation (Active):    45                45 degrees  External rotation (Passive):    45                45 degrees  Internal rotation (Active):    L-1                L-1  Internal rotation (Passive):    L-1                L-1    No motor weakness to internal rotation, external rotation or abduction in the scapular plane.  Negative crank test.  Negative Nash's test.  Negative Speeds test. Negative Yergason's test.  Negative cross arm test.  No tenderness to palpation at the AC joint. Positive Dewitt sign.  Positive Neers sign. Negative apprehension. Negative sulcus sign.  No gross neurological or vascular deficits distally.  Skin is intact.  No rashes, scars or lesions. 2+ radial and ulnar pulses. No extra-articular swelling or tenderness.     [de-identified] : Appearance:  Well-developed, well-nourished female in no acute distress.   [de-identified] : Radiographs, two views of the right shoulder taken in the office today, show no obvious osseous abnormalities.

## 2024-09-30 NOTE — DISCUSSION/SUMMARY
[de-identified] : At this time, due to right shoulder impingement syndrome, I recommended to start on a course of physical therapy, topical anti-inflammatory, and reassessment in 4-6 weeks.

## 2024-09-30 NOTE — HISTORY OF PRESENT ILLNESS
[de-identified] : The patient comes in today with complaints of pain to her right shoulder.  She states it has been going on for several months and has been getting a little worse recently.  The patient states the onset/injury occurred in 06/2024.  This injury is not work related or due to an automobile accident.  The patient states the pain is constant when using the arm.    [5] : a current pain level of 5/10 [de-identified] : Lifting anything and moving in certain position [de-identified] : No movement

## 2024-09-30 NOTE — CONSULT LETTER
[Dear  ___] : Dear  [unfilled], [Consult Letter:] : I had the pleasure of evaluating your patient, [unfilled]. [Please see my note below.] : Please see my note below. [Consult Closing:] : Thank you very much for allowing me to participate in the care of this patient.  If you have any questions, please do not hesitate to contact me. [Sincerely,] : Sincerely, [FreeTextEntry3] : Rashad Hairston, III, MD [facility]/norris

## 2024-09-30 NOTE — PHYSICAL EXAM
[Normal] : Gait: normal [de-identified] : Left Shoulder: Shoulder: Range of Motion in Degrees:                                 Claimant:   Normal:    Abduction (Active)   180   180 degrees    Abduction (Passive)   180   180 degrees    Forward elevation (Active):   180   180 degrees    Forward elevation (Passive):  180   180 degrees    External rotation (Active):   45   45 degrees    External rotation (Passive):   45   45 degrees    Internal rotation (Active):   L-1   L-1    Internal rotation (Passive):   L-1   L-1      No motor weakness to internal rotation, external rotation or abduction in the scapular plane.  Negative crank test.  Negative Stark's test.  Negative Speeds test. Negative Yergason's test.  Negative cross arm test.  No tenderness to palpation at the AC joint. Negative Dewitt sign.  Negative Neers sign.  Negative apprehension. Negative sulcus sign.  No gross neurological or vascular deficits distally.  Skin is intact.  No rashes, scars or lesions.  2+ radial and ulnar pulses. No extra-articular swelling or tenderness.  Right Shoulder:  Shoulder: Range of Motion in Degrees:                                     Claimant: Normal:  Abduction (Active)                   180                180 degrees  Abduction (Passive)   180                180 degrees  Forward elevation (Active):   180                180 degrees  Forward elevation (Passive):   180                180 degrees  External rotation (Active):    45                45 degrees  External rotation (Passive):    45                45 degrees  Internal rotation (Active):    L-1                L-1  Internal rotation (Passive):    L-1                L-1    No motor weakness to internal rotation, external rotation or abduction in the scapular plane.  Negative crank test.  Negative Stark's test.  Negative Speeds test. Negative Yergason's test.  Negative cross arm test.  No tenderness to palpation at the AC joint. Positive Dewitt sign.  Positive Neers sign. Negative apprehension. Negative sulcus sign.  No gross neurological or vascular deficits distally.  Skin is intact.  No rashes, scars or lesions. 2+ radial and ulnar pulses. No extra-articular swelling or tenderness.     [de-identified] : Appearance:  Well-developed, well-nourished female in no acute distress.   [de-identified] : Radiographs, two views of the right shoulder taken in the office today, show no obvious osseous abnormalities.

## 2024-10-23 ENCOUNTER — APPOINTMENT (OUTPATIENT)
Dept: ORTHOPEDIC SURGERY | Facility: CLINIC | Age: 64
End: 2024-10-23

## 2024-10-23 VITALS
WEIGHT: 160 LBS | HEART RATE: 82 BPM | HEIGHT: 63 IN | BODY MASS INDEX: 28.35 KG/M2 | SYSTOLIC BLOOD PRESSURE: 157 MMHG | DIASTOLIC BLOOD PRESSURE: 89 MMHG

## 2024-10-23 DIAGNOSIS — M75.41 IMPINGEMENT SYNDROME OF RIGHT SHOULDER: ICD-10-CM

## 2024-10-23 PROCEDURE — 99212 OFFICE O/P EST SF 10 MIN: CPT

## 2024-11-07 ENCOUNTER — OFFICE (OUTPATIENT)
Dept: URBAN - METROPOLITAN AREA CLINIC 102 | Facility: CLINIC | Age: 64
Setting detail: OPHTHALMOLOGY
End: 2024-11-07
Payer: MEDICARE

## 2024-11-07 DIAGNOSIS — H35.3132: ICD-10-CM

## 2024-11-07 DIAGNOSIS — H25.13: ICD-10-CM

## 2024-11-07 DIAGNOSIS — H35.363: ICD-10-CM

## 2024-11-07 DIAGNOSIS — H40.013: ICD-10-CM

## 2024-11-07 PROCEDURE — 76514 ECHO EXAM OF EYE THICKNESS: CPT | Performed by: OPHTHALMOLOGY

## 2024-11-07 PROCEDURE — 92134 CPTRZ OPH DX IMG PST SGM RTA: CPT | Performed by: OPHTHALMOLOGY

## 2024-11-07 PROCEDURE — 92014 COMPRE OPH EXAM EST PT 1/>: CPT | Performed by: OPHTHALMOLOGY

## 2024-11-07 ASSESSMENT — REFRACTION_MANIFEST
OD_VA1: 20/40
OS_VA1: 20/NI
OD_VA1: 20/25-1
OS_CYLINDER: -0.50
OS_ADD: +2.50
OD_ADD: +2.50
OS_CYLINDER: SPH
OD_SPHERE: +1.75
OS_AXIS: 126
OD_AXIS: 070
OD_SPHERE: +2.00
OS_SPHERE: +1.75
OS_SPHERE: +2.00
OD_CYLINDER: -0.25
OD_CYLINDER: -0.25
OS_VA1: 20/40+
OD_AXIS: 091

## 2024-11-07 ASSESSMENT — REFRACTION_CURRENTRX
OS_SPHERE: +4.75
OS_AXIS: 174
OS_CYLINDER: -0.50
OD_CYLINDER: -0.50
OS_OVR_VA: 20/
OD_AXIS: 019
OS_CYLINDER: -0.50
OD_VPRISM_DIRECTION: SV
OS_VPRISM_DIRECTION: SV
OD_CYLINDER: -0.75
OD_VPRISM_DIRECTION: SV
OD_SPHERE: +5.00
OS_SPHERE: +2.00
OS_OVR_VA: 20/
OS_AXIS: 159
OD_OVR_VA: 20/
OD_SPHERE: +2.00
OD_OVR_VA: 20/
OS_VPRISM_DIRECTION: SV
OD_AXIS: 109

## 2024-11-07 ASSESSMENT — KERATOMETRY
OD_K1POWER_DIOPTERS: 47.00
OD_K2POWER_DIOPTERS: 48.00
OS_AXISANGLE_DEGREES: 091
OS_K2POWER_DIOPTERS: 48.50
METHOD_AUTO_MANUAL: AUTO
OS_K1POWER_DIOPTERS: 47.50
OD_AXISANGLE_DEGREES: 080

## 2024-11-07 ASSESSMENT — REFRACTION_AUTOREFRACTION
OS_SPHERE: +2.00
OD_SPHERE: +2.00
OS_CYLINDER: -0.50
OS_AXIS: 126
OD_CYLINDER: -0.25
OD_AXIS: 091

## 2024-11-07 ASSESSMENT — PACHYMETRY
OS_CT_CORRECTION: -2
OS_CT_UM: 573
OD_CT_CORRECTION: -1
OD_CT_UM: 568

## 2024-11-07 ASSESSMENT — VISUAL ACUITY
OD_BCVA: 20/30+
OS_BCVA: 20/30-1

## 2024-11-07 ASSESSMENT — TONOMETRY: OS_IOP_MMHG: 17

## 2024-11-07 ASSESSMENT — CONFRONTATIONAL VISUAL FIELD TEST (CVF)
OS_FINDINGS: FULL
OD_FINDINGS: FULL

## 2024-11-20 NOTE — HISTORY OF PRESENT ILLNESS
Measure In Units Or Cc's?: units [FreeTextEntry1] : 60-year-old female presents today for reevaluation of her left thumb pain.  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  She has been treated conservatively today with use of a Comfort Cool thumb brace and activity modification.  She was recommended for an ultrasound-guided steroid injection to the left basal joint, and notes that she had it completed on April 5.  She reports significant benefit from the injection and notes she is about 85% improved.  She has been taking Tylenol as needed, notes she cannot take NSAIDs due to prior gastric surgery.  The patient presents today for evaluation and further treatment. Expiration Date (Month Year): 09/2026 Consent: Written consent obtained. Risks include but not limited to lid/brow ptosis, bruising, swelling, diplopia, temporary effect, incomplete chemical denervation. Price Per Unit Or Per Cc In $ (Use Numbers Only, No Special Characters Or $): 15 Lot #: [id number] Detail Level: Zone Dilution (U/0.1 Cc): 4 Post-Care Instructions: Patient instructed to not lie down for 4 hours and limit physical activity for 24 hours. Quantity Per Injection Site (Units Or Cc): 25 Quantity Per Injection Site (Units Or Cc): 8

## 2025-04-18 NOTE — ED STATDOCS - NS ED MD EM SELECTION
Transitional Care Management Telephone Call Attempt    Discharge Date: 4/3/25  Discharge Location: [facility] Hospital: [facility]    Call Attempt Date: 4/18/2025  Call Attempt: First   [id number] Exp Problem Focused - Mod. Complex

## 2025-05-16 ENCOUNTER — OFFICE (OUTPATIENT)
Dept: URBAN - METROPOLITAN AREA CLINIC 102 | Facility: CLINIC | Age: 65
Setting detail: OPHTHALMOLOGY
End: 2025-05-16
Payer: MEDICARE

## 2025-05-16 DIAGNOSIS — H35.363: ICD-10-CM

## 2025-05-16 DIAGNOSIS — H35.3132: ICD-10-CM

## 2025-05-16 DIAGNOSIS — H40.013: ICD-10-CM

## 2025-05-16 DIAGNOSIS — H25.13: ICD-10-CM

## 2025-05-16 PROCEDURE — 92083 EXTENDED VISUAL FIELD XM: CPT | Performed by: OPHTHALMOLOGY

## 2025-05-16 PROCEDURE — 92133 CPTRZD OPH DX IMG PST SGM ON: CPT | Performed by: OPHTHALMOLOGY

## 2025-05-16 PROCEDURE — 92020 GONIOSCOPY: CPT | Performed by: OPHTHALMOLOGY

## 2025-05-16 PROCEDURE — 99214 OFFICE O/P EST MOD 30 MIN: CPT | Performed by: OPHTHALMOLOGY

## 2025-05-16 ASSESSMENT — REFRACTION_MANIFEST
OS_AXIS: 126
OD_SPHERE: +2.00
OS_VA1: 20/40+
OS_CYLINDER: -0.50
OS_CYLINDER: SPH
OD_CYLINDER: -0.25
OS_VA1: 20/NI
OS_SPHERE: +2.00
OS_ADD: +2.50
OS_SPHERE: +1.75
OD_AXIS: 091
OD_AXIS: 070
OD_VA1: 20/25-1
OD_ADD: +2.50
OD_CYLINDER: -0.25
OD_VA1: 20/40
OD_SPHERE: +1.75

## 2025-05-16 ASSESSMENT — REFRACTION_CURRENTRX
OD_OVR_VA: 20/
OD_AXIS: 109
OD_VPRISM_DIRECTION: SV
OD_CYLINDER: -0.50
OS_AXIS: 159
OS_CYLINDER: -0.50
OS_CYLINDER: -0.50
OS_SPHERE: +2.00
OD_SPHERE: +2.00
OS_OVR_VA: 20/
OD_SPHERE: +5.00
OD_CYLINDER: -0.75
OS_VPRISM_DIRECTION: SV
OS_VPRISM_DIRECTION: SV
OS_AXIS: 174
OD_VPRISM_DIRECTION: SV
OD_AXIS: 019
OD_OVR_VA: 20/
OS_OVR_VA: 20/
OS_SPHERE: +4.75

## 2025-05-16 ASSESSMENT — PACHYMETRY
OS_CT_CORRECTION: -2
OD_CT_CORRECTION: -1
OD_CT_UM: 568
OS_CT_UM: 573

## 2025-05-16 ASSESSMENT — TONOMETRY
OS_IOP_MMHG: 21
OS_IOP_MMHG: 18
OD_IOP_MMHG: 15
OD_IOP_MMHG: 21

## 2025-05-16 ASSESSMENT — KERATOMETRY
OS_K2POWER_DIOPTERS: 48.25
METHOD_AUTO_MANUAL: AUTO
OD_K2POWER_DIOPTERS: 48.25
OS_AXISANGLE_DEGREES: 086
OS_K1POWER_DIOPTERS: 47.25
OD_K1POWER_DIOPTERS: 47.25
OD_AXISANGLE_DEGREES: 083

## 2025-05-16 ASSESSMENT — CONFRONTATIONAL VISUAL FIELD TEST (CVF)
OD_FINDINGS: FULL
OS_FINDINGS: FULL

## 2025-05-16 ASSESSMENT — VISUAL ACUITY
OS_BCVA: 20/30-1
OD_BCVA: 20/30-1

## 2025-05-16 ASSESSMENT — REFRACTION_AUTOREFRACTION
OS_SPHERE: +2.00
OS_CYLINDER: -0.50
OD_AXIS: 068
OD_SPHERE: +2.00
OD_CYLINDER: -0.25
OS_AXIS: 154